# Patient Record
Sex: MALE | Race: WHITE | NOT HISPANIC OR LATINO | URBAN - METROPOLITAN AREA
[De-identification: names, ages, dates, MRNs, and addresses within clinical notes are randomized per-mention and may not be internally consistent; named-entity substitution may affect disease eponyms.]

---

## 2018-12-26 ENCOUNTER — EMERGENCY (EMERGENCY)
Facility: HOSPITAL | Age: 83
LOS: 1 days | Discharge: HOME | End: 2018-12-26
Attending: EMERGENCY MEDICINE
Payer: MEDICARE

## 2018-12-26 VITALS
OXYGEN SATURATION: 95 % | RESPIRATION RATE: 16 BRPM | DIASTOLIC BLOOD PRESSURE: 87 MMHG | SYSTOLIC BLOOD PRESSURE: 209 MMHG | WEIGHT: 149.91 LBS | HEART RATE: 57 BPM | TEMPERATURE: 97 F | HEIGHT: 67 IN

## 2018-12-26 LAB
ALBUMIN SERPL ELPH-MCNC: 4.2 G/DL — SIGNIFICANT CHANGE UP (ref 3.3–5.2)
ALP SERPL-CCNC: 59 U/L — SIGNIFICANT CHANGE UP (ref 40–120)
ALT FLD-CCNC: 12 U/L — SIGNIFICANT CHANGE UP
ANION GAP SERPL CALC-SCNC: 11 MMOL/L — SIGNIFICANT CHANGE UP (ref 5–17)
APPEARANCE UR: CLEAR — SIGNIFICANT CHANGE UP
APTT BLD: 30.8 SEC — SIGNIFICANT CHANGE UP (ref 27.5–36.3)
AST SERPL-CCNC: 24 U/L — SIGNIFICANT CHANGE UP
BACTERIA # UR AUTO: ABNORMAL
BASOPHILS # BLD AUTO: 0 K/UL — SIGNIFICANT CHANGE UP (ref 0–0.2)
BASOPHILS NFR BLD AUTO: 0.3 % — SIGNIFICANT CHANGE UP (ref 0–2)
BILIRUB SERPL-MCNC: 0.4 MG/DL — SIGNIFICANT CHANGE UP (ref 0.4–2)
BILIRUB UR-MCNC: NEGATIVE — SIGNIFICANT CHANGE UP
BUN SERPL-MCNC: 38 MG/DL — HIGH (ref 8–20)
CALCIUM SERPL-MCNC: 9.3 MG/DL — SIGNIFICANT CHANGE UP (ref 8.6–10.2)
CHLORIDE SERPL-SCNC: 104 MMOL/L — SIGNIFICANT CHANGE UP (ref 98–107)
CO2 SERPL-SCNC: 27 MMOL/L — SIGNIFICANT CHANGE UP (ref 22–29)
COLOR SPEC: YELLOW — SIGNIFICANT CHANGE UP
COMMENT - URINE: SIGNIFICANT CHANGE UP
CREAT SERPL-MCNC: 0.83 MG/DL — SIGNIFICANT CHANGE UP (ref 0.5–1.3)
DIFF PNL FLD: ABNORMAL
EOSINOPHIL # BLD AUTO: 0.1 K/UL — SIGNIFICANT CHANGE UP (ref 0–0.5)
EOSINOPHIL NFR BLD AUTO: 1.4 % — SIGNIFICANT CHANGE UP (ref 0–5)
EPI CELLS # UR: SIGNIFICANT CHANGE UP
GLUCOSE SERPL-MCNC: 132 MG/DL — HIGH (ref 70–115)
GLUCOSE UR QL: NEGATIVE MG/DL — SIGNIFICANT CHANGE UP
HCT VFR BLD CALC: 44.7 % — SIGNIFICANT CHANGE UP (ref 42–52)
HGB BLD-MCNC: 14.4 G/DL — SIGNIFICANT CHANGE UP (ref 14–18)
INR BLD: 1.03 RATIO — SIGNIFICANT CHANGE UP (ref 0.88–1.16)
KETONES UR-MCNC: NEGATIVE — SIGNIFICANT CHANGE UP
LACTATE BLDV-MCNC: 1.2 MMOL/L — SIGNIFICANT CHANGE UP (ref 0.5–2)
LEUKOCYTE ESTERASE UR-ACNC: NEGATIVE — SIGNIFICANT CHANGE UP
LYMPHOCYTES # BLD AUTO: 1.1 K/UL — SIGNIFICANT CHANGE UP (ref 1–4.8)
LYMPHOCYTES # BLD AUTO: 14.3 % — LOW (ref 20–55)
MCHC RBC-ENTMCNC: 31.3 PG — HIGH (ref 27–31)
MCHC RBC-ENTMCNC: 32.2 G/DL — SIGNIFICANT CHANGE UP (ref 32–36)
MCV RBC AUTO: 97.2 FL — HIGH (ref 80–94)
MONOCYTES # BLD AUTO: 0.8 K/UL — SIGNIFICANT CHANGE UP (ref 0–0.8)
MONOCYTES NFR BLD AUTO: 9.5 % — SIGNIFICANT CHANGE UP (ref 3–10)
NEUTROPHILS # BLD AUTO: 5.9 K/UL — SIGNIFICANT CHANGE UP (ref 1.8–8)
NEUTROPHILS NFR BLD AUTO: 74.4 % — HIGH (ref 37–73)
NITRITE UR-MCNC: NEGATIVE — SIGNIFICANT CHANGE UP
PH UR: 8 — SIGNIFICANT CHANGE UP (ref 5–8)
PLATELET # BLD AUTO: 215 K/UL — SIGNIFICANT CHANGE UP (ref 150–400)
POTASSIUM SERPL-MCNC: 4.8 MMOL/L — SIGNIFICANT CHANGE UP (ref 3.5–5.3)
POTASSIUM SERPL-SCNC: 4.8 MMOL/L — SIGNIFICANT CHANGE UP (ref 3.5–5.3)
PROT SERPL-MCNC: 7.7 G/DL — SIGNIFICANT CHANGE UP (ref 6.6–8.7)
PROT UR-MCNC: 30 MG/DL
PROTHROM AB SERPL-ACNC: 11.9 SEC — SIGNIFICANT CHANGE UP (ref 10–12.9)
RBC # BLD: 4.6 M/UL — SIGNIFICANT CHANGE UP (ref 4.6–6.2)
RBC # FLD: 14.1 % — SIGNIFICANT CHANGE UP (ref 11–15.6)
RBC CASTS # UR COMP ASSIST: SIGNIFICANT CHANGE UP /HPF (ref 0–4)
SODIUM SERPL-SCNC: 142 MMOL/L — SIGNIFICANT CHANGE UP (ref 135–145)
SP GR SPEC: 1.01 — SIGNIFICANT CHANGE UP (ref 1.01–1.02)
UROBILINOGEN FLD QL: NEGATIVE MG/DL — SIGNIFICANT CHANGE UP
WBC # BLD: 8 K/UL — SIGNIFICANT CHANGE UP (ref 4.8–10.8)
WBC # FLD AUTO: 8 K/UL — SIGNIFICANT CHANGE UP (ref 4.8–10.8)
WBC UR QL: SIGNIFICANT CHANGE UP

## 2018-12-26 PROCEDURE — 74177 CT ABD & PELVIS W/CONTRAST: CPT | Mod: 26

## 2018-12-26 PROCEDURE — 99284 EMERGENCY DEPT VISIT MOD MDM: CPT | Mod: 25

## 2018-12-26 PROCEDURE — 93010 ELECTROCARDIOGRAM REPORT: CPT

## 2018-12-26 RX ORDER — ACETAMINOPHEN 500 MG
1000 TABLET ORAL ONCE
Qty: 0 | Refills: 0 | Status: COMPLETED | OUTPATIENT
Start: 2018-12-26 | End: 2018-12-26

## 2018-12-26 RX ORDER — HYDRALAZINE HCL 50 MG
10 TABLET ORAL ONCE
Qty: 0 | Refills: 0 | Status: COMPLETED | OUTPATIENT
Start: 2018-12-26 | End: 2018-12-26

## 2018-12-26 RX ORDER — MORPHINE SULFATE 50 MG/1
4 CAPSULE, EXTENDED RELEASE ORAL ONCE
Qty: 0 | Refills: 0 | Status: DISCONTINUED | OUTPATIENT
Start: 2018-12-26 | End: 2018-12-26

## 2018-12-26 RX ORDER — SODIUM CHLORIDE 9 MG/ML
1000 INJECTION INTRAMUSCULAR; INTRAVENOUS; SUBCUTANEOUS ONCE
Qty: 0 | Refills: 0 | Status: COMPLETED | OUTPATIENT
Start: 2018-12-26 | End: 2018-12-26

## 2018-12-26 RX ADMIN — Medication 1000 MILLIGRAM(S): at 20:30

## 2018-12-26 RX ADMIN — Medication 1000 MILLIGRAM(S): at 20:46

## 2018-12-26 RX ADMIN — SODIUM CHLORIDE 1000 MILLILITER(S): 9 INJECTION INTRAMUSCULAR; INTRAVENOUS; SUBCUTANEOUS at 20:16

## 2018-12-26 RX ADMIN — SODIUM CHLORIDE 1000 MILLILITER(S): 9 INJECTION INTRAMUSCULAR; INTRAVENOUS; SUBCUTANEOUS at 21:16

## 2018-12-26 RX ADMIN — Medication 400 MILLIGRAM(S): at 20:16

## 2018-12-26 NOTE — ED STATDOCS - ATTENDING CONTRIBUTION TO CARE
I, Melba Case, performed the initial face to face bedside interview with this patient regarding history of present illness, review of symptoms and relevant past medical, social and family history.  I completed an independent physical examination.   The medical decision making and follow-up on ordered tests (ie labs, radiologic studies) and re-evaluation of the patient's status has been communicated to the ACP.  Disposition of the patient will be based on test outcome and response to ED interventions.  The history, relevant review of systems, past medical and surgical history, medical decision making, and physical examination was documented by the scribe in my presence and I attest to the accuracy of the documentation.

## 2018-12-26 NOTE — ED STATDOCS - OBJECTIVE STATEMENT
88 y/o M pt with hx of no significant medical hx presents to ED c/o suprapubic/right groin region that is focal and hard mass, denies radiating pain. Last normal BM was yesterday but has not gone today, states he usually goes daily. Pain exacerbation with coughing. He states he ate a muffin earlier and believes it solidified in his abdomen into hard mass. Pt exercises regularly but has not done so over the last month. Pt repeats himself often, lives alone at home. Denies N/V/D, fever, chills, SOB, CP, difficulty breathing. 86 y/o M pt with hx of no significant medical hx presents to ED c/o suprapubic/right groin region that is focal and hard mass, denies radiating pain. Last normal BM was yesterday but has not gone today, states he usually goes daily. Pain exacerbation with coughing. He states he ate a muffin earlier and believes it solidified in his abdomen into hard mass. Pt exercises regularly but has not done so over the last month. Pt repeats himself often, lives alone at home. Denies N/V/D, fever, chills, SOB, CP, difficulty breathing.    PSH- appendectomy/cholecystectomy

## 2018-12-26 NOTE — ED STATDOCS - NS ED ROS FT
ROS: no CP/SOB. no cough. no fever. no n/v/d/c. (+) abd pain. no rash. no bleeding. no urinary complaints. no weakness. no vision changes. no HA. no neck/back pain. no extremity swelling/deformity. No change in mental status. ROS: no CP/SOB. no cough. no fever. no n/v/d. (+) abd pain. no rash. no bleeding. no urinary complaints. no weakness. no vision changes. no HA. no neck/back pain. no extremity swelling/deformity. No change in mental status.

## 2018-12-26 NOTE — ED ADULT NURSE NOTE - OBJECTIVE STATEMENT
received pt AOx4 in supertrack, c/o mass like lump lower right pelvic region, pt states he thinks he ate a corn muffin and it turned to concrete in his stomach. pt with 2 episode of vomiting in ed, sates he never sees doctors bc he is healthy and there is nothing wrong with him. resp even unlabored, in no distress, MAEx4, neuro intact. will continue to monitor

## 2018-12-26 NOTE — ED STATDOCS - PROGRESS NOTE DETAILS
88 y/o M pt with hx of no significant medical hx presents to ED c/o suprapubic/right groin region that is focal and hard mass, denies radiating pain. Last normal BM was yesterday but has not gone today, states he usually goes daily. Pain exacerbation with coughing. He states he ate a muffin earlier and believes it solidified in his abdomen into hard mass. Pt exercises regularly but has not done so over the last month. Pt repeats himself often, lives alone at home. Denies N/V/D, fever, chills, SOB, CP, difficulty breathing. PT evaluated by intake physician. I spoke to and re-examined patient and agree with HPI/PE/ROS as noted above. Will follow up plan per intake physician.  Patient states pain is improved from presentation at this time, is only mildly still present. Patient refusing medication for his elevated BP at this time. States he only takes vitamins Consulted surgery regarding CT abd/pel findings. Surgery will see patient in ED Patient seen by Surgery in ED. Surgery resident was able to reduce hernia in ED. Surgery recommend having patient placed in observation NPO with plan to re-assess patient in AM for possible admission. Will discuss case with Observation PA and decide plan.

## 2018-12-26 NOTE — ED ADULT NURSE NOTE - CAS EDN DISCHARGE ASSESSMENT
Alert and oriented to person, place and time/Patient in stable condition. No s/s of acute distress. Denies pain. Wants to go home. PAtient educated about importance of checking blood pressure. Discharge instructions given and explained to patient and patient verbalizes quick and easy understanding. Taxi company called for transportation, patient ambulatory. In stable condition.

## 2018-12-26 NOTE — ED ADULT NURSE NOTE - NSIMPLEMENTINTERV_GEN_ALL_ED
Implemented All Universal Safety Interventions:  [place] to call system. Call bell, personal items and telephone within reach. Instruct patient to call for assistance. Room bathroom lighting operational. Non-slip footwear when patient is off stretcher. Physically safe environment: no spills, clutter or unnecessary equipment. Stretcher in lowest position, wheels locked, appropriate side rails in place.

## 2018-12-26 NOTE — ED STATDOCS - PHYSICAL EXAMINATION
Gen: NAD, AOx3  Head: NCAT  HEENT: PERRL, EOMI, oral mucosa moist, normal conjunctiva, neck supple  Lung: CTAB, no respiratory distress  CV: rrr, no murmur, Normal perfusion  Abd: soft, no CVA tenderness, mass for right groin region, tender and nonreducible. No overlying skin changes.   MSK: No edema, no visible deformities  Neuro: No focal neurologic deficits  Skin: No rash   Psych: normal affect Gen: NAD, AOx3  Head: NCAT  HEENT: PERRL, EOMI, oral mucosa moist, normal conjunctiva, neck supple  Lung: CTAB, no respiratory distress  CV: rrr, no murmur, Normal perfusion  Abd: soft, no CVA tenderness, +mass right groin region, tender and nonreducible. No overlying skin changes.   MSK: No edema, no visible deformities  Neuro: No focal neurologic deficits  Skin: No rash   Psych: normal affect

## 2018-12-26 NOTE — ED ADULT NURSE REASSESSMENT NOTE - NS ED NURSE REASSESS COMMENT FT1
pt back from ct at this time, awaiting results, updated regarding plan of care, will cont to monitor. resp even unlabored, In no distress.

## 2018-12-26 NOTE — ED STATDOCS - MEDICAL DECISION MAKING DETAILS
Pt with new onset RLQ pain with mass x 1 day, denies BM today. possible hernial obstruction, non toxic appearing. Pt is hypertensive, will treat pain first. No HA or CP has not seen doctor in years. Pt with new onset RLQ pain with mass x 1 day, denies BM today. possible hernial obstruction, non toxic appearing. Pt is hypertensive, will treat pain first. denies any PMH but has not seen Doctor in years. No HA or CP. screening EKG no acute ischemic changes. will check basic labs, urine and CT A/P

## 2018-12-27 VITALS — SYSTOLIC BLOOD PRESSURE: 161 MMHG | DIASTOLIC BLOOD PRESSURE: 81 MMHG

## 2018-12-27 LAB
ANION GAP SERPL CALC-SCNC: 11 MMOL/L — SIGNIFICANT CHANGE UP (ref 5–17)
BASOPHILS # BLD AUTO: 0 K/UL — SIGNIFICANT CHANGE UP (ref 0–0.2)
BASOPHILS NFR BLD AUTO: 0.3 % — SIGNIFICANT CHANGE UP (ref 0–2)
BUN SERPL-MCNC: 25 MG/DL — HIGH (ref 8–20)
CALCIUM SERPL-MCNC: 8.3 MG/DL — LOW (ref 8.6–10.2)
CHLORIDE SERPL-SCNC: 104 MMOL/L — SIGNIFICANT CHANGE UP (ref 98–107)
CO2 SERPL-SCNC: 26 MMOL/L — SIGNIFICANT CHANGE UP (ref 22–29)
CREAT SERPL-MCNC: 0.78 MG/DL — SIGNIFICANT CHANGE UP (ref 0.5–1.3)
EOSINOPHIL # BLD AUTO: 0.1 K/UL — SIGNIFICANT CHANGE UP (ref 0–0.5)
EOSINOPHIL NFR BLD AUTO: 0.8 % — SIGNIFICANT CHANGE UP (ref 0–5)
GLUCOSE SERPL-MCNC: 100 MG/DL — SIGNIFICANT CHANGE UP (ref 70–115)
HCT VFR BLD CALC: 37.4 % — LOW (ref 42–52)
HGB BLD-MCNC: 12.1 G/DL — LOW (ref 14–18)
LYMPHOCYTES # BLD AUTO: 1.3 K/UL — SIGNIFICANT CHANGE UP (ref 1–4.8)
LYMPHOCYTES # BLD AUTO: 18.1 % — LOW (ref 20–55)
MCHC RBC-ENTMCNC: 31.3 PG — HIGH (ref 27–31)
MCHC RBC-ENTMCNC: 32.4 G/DL — SIGNIFICANT CHANGE UP (ref 32–36)
MCV RBC AUTO: 96.6 FL — HIGH (ref 80–94)
MONOCYTES # BLD AUTO: 0.8 K/UL — SIGNIFICANT CHANGE UP (ref 0–0.8)
MONOCYTES NFR BLD AUTO: 10.7 % — HIGH (ref 3–10)
NEUTROPHILS # BLD AUTO: 4.9 K/UL — SIGNIFICANT CHANGE UP (ref 1.8–8)
NEUTROPHILS NFR BLD AUTO: 69.8 % — SIGNIFICANT CHANGE UP (ref 37–73)
PLATELET # BLD AUTO: 185 K/UL — SIGNIFICANT CHANGE UP (ref 150–400)
POTASSIUM SERPL-MCNC: 3.8 MMOL/L — SIGNIFICANT CHANGE UP (ref 3.5–5.3)
POTASSIUM SERPL-SCNC: 3.8 MMOL/L — SIGNIFICANT CHANGE UP (ref 3.5–5.3)
RBC # BLD: 3.87 M/UL — LOW (ref 4.6–6.2)
RBC # FLD: 14 % — SIGNIFICANT CHANGE UP (ref 11–15.6)
SODIUM SERPL-SCNC: 141 MMOL/L — SIGNIFICANT CHANGE UP (ref 135–145)
WBC # BLD: 7.1 K/UL — SIGNIFICANT CHANGE UP (ref 4.8–10.8)
WBC # FLD AUTO: 7.1 K/UL — SIGNIFICANT CHANGE UP (ref 4.8–10.8)

## 2018-12-27 PROCEDURE — 96361 HYDRATE IV INFUSION ADD-ON: CPT

## 2018-12-27 PROCEDURE — 80053 COMPREHEN METABOLIC PANEL: CPT

## 2018-12-27 PROCEDURE — 85610 PROTHROMBIN TIME: CPT

## 2018-12-27 PROCEDURE — 36415 COLL VENOUS BLD VENIPUNCTURE: CPT

## 2018-12-27 PROCEDURE — 93005 ELECTROCARDIOGRAM TRACING: CPT

## 2018-12-27 PROCEDURE — G0378: CPT

## 2018-12-27 PROCEDURE — 85730 THROMBOPLASTIN TIME PARTIAL: CPT

## 2018-12-27 PROCEDURE — 74177 CT ABD & PELVIS W/CONTRAST: CPT

## 2018-12-27 PROCEDURE — 99284 EMERGENCY DEPT VISIT MOD MDM: CPT | Mod: 25

## 2018-12-27 PROCEDURE — 99218: CPT

## 2018-12-27 PROCEDURE — 80048 BASIC METABOLIC PNL TOTAL CA: CPT

## 2018-12-27 PROCEDURE — 83605 ASSAY OF LACTIC ACID: CPT

## 2018-12-27 PROCEDURE — 81001 URINALYSIS AUTO W/SCOPE: CPT

## 2018-12-27 PROCEDURE — 85027 COMPLETE CBC AUTOMATED: CPT

## 2018-12-27 PROCEDURE — 96374 THER/PROPH/DIAG INJ IV PUSH: CPT | Mod: XU

## 2018-12-27 RX ORDER — SODIUM CHLORIDE 9 MG/ML
1000 INJECTION, SOLUTION INTRAVENOUS
Qty: 0 | Refills: 0 | Status: DISCONTINUED | OUTPATIENT
Start: 2018-12-27 | End: 2018-12-31

## 2018-12-27 RX ORDER — SODIUM CHLORIDE 9 MG/ML
1000 INJECTION INTRAMUSCULAR; INTRAVENOUS; SUBCUTANEOUS ONCE
Qty: 0 | Refills: 0 | Status: DISCONTINUED | OUTPATIENT
Start: 2018-12-27 | End: 2018-12-27

## 2018-12-27 RX ADMIN — SODIUM CHLORIDE 100 MILLILITER(S): 9 INJECTION, SOLUTION INTRAVENOUS at 02:50

## 2018-12-27 NOTE — ED ADULT NURSE REASSESSMENT NOTE - NS ED NURSE REASSESS COMMENT FT1
Assumed care of the patient at 0730. Patient A&Ox3. No s/s of distress. Abdomen soft, denies any abdominal pain at this time, denies n/v/d, states passing gas. Per nightshift RN patient have been hypertensive, PA aware however patient refuses any medications for BP, patient educated about importance of maintaing BP WDL, patient states "he will just continue taking his vitamins". BP this am better 163/81, HR 80. Patient denies any HA or visual disturbances. Resting comfortably. Surgery to see patient. Will continue to monitor.

## 2018-12-27 NOTE — CONSULT NOTE ADULT - ATTENDING COMMENTS
Incarcerated right inguinal hernia reduced in ER.  Place under observation for serial abdominal examinations. Patient prefers to schedule hernia repair on outpatient basis.  If abdominal exam remains benign, PO challenge in AM and then patient to follow up in clinica as outpatient to plan for hernia repair. Incarcerated right inguinal hernia reduced in ER.  Place under observation for serial abdominal examinations. Patient advised to be admitted for hernia repair due to risks of reicnarceration with strangulation. However, Patient prefers to schedule hernia repair on outpatient basis.  If abdominal exam remains benign, PO challenge in AM and then patient to follow up in clinica as outpatient to plan for hernia repair.

## 2018-12-27 NOTE — ED CDU PROVIDER DISPOSITION NOTE - ATTENDING CONTRIBUTION TO CARE
Admitted to observation following reduction of incarcerated inguinal hernia.  Seen by surgery this morning and advised admission for hernia repair: patient refused.  Educated on signs and symptoms of obstruction.  Also advised about blood pressure elevation.  Patient has not seen a doctor in more than 3 years and does not want to take anything other than vitamins for health maintenance.  Advised routine BP checks and follow-up with physician if persistently above 140/85.  BP on discharge 161/85.

## 2018-12-27 NOTE — ED ADULT NURSE REASSESSMENT NOTE - NS ED NURSE REASSESS COMMENT FT1
pt A&Ox4, pt still refusing BP meds and gets irritated when asked about or educated on importance of meds and risks of HTN... ARPITA almaraz

## 2018-12-27 NOTE — ED ADULT NURSE REASSESSMENT NOTE - NS ED NURSE REASSESS COMMENT FT1
BP elevated, 193/85. Pt continuing to refuse medication for tmt of BP. Pt denies vision changes or headache. PA aware.

## 2018-12-27 NOTE — ED CDU PROVIDER INITIAL DAY NOTE - SHIFT CHANGE DETAILS
Patient is sitting comfortably, 2nd CBC resulted, informed surgery of results, surgery states coming to see the patient this morning and will develop a plan, monitor patient and reassess

## 2018-12-27 NOTE — ED ADULT NURSE REASSESSMENT NOTE - NS ED NURSE REASSESS COMMENT FT1
Report received from HENRY Vanessa. Assumed care of pt @ 0230, charting as noted. Pt AOx4 in no acute distress. Pt with c/o abdominal hernia. VS assessed, BP remains elevated and pt continuing to refuse any blood pressure medications from staff. Pt becoming agitated with repeated blood pressure education from staff. Pt denies any blurry vision or headaches. ARPITA mendez aware, no new orders due to pt refusal of medications. Pt educated on observation stay and plan of care. Bed locked in lowest position, call bell in reach. [place] and urinal provided for pt comfort.

## 2018-12-27 NOTE — ED CDU PROVIDER DISPOSITION NOTE - CLINICAL COURSE
Patient is a 86 y/o male presenting with surapubic/right groin region. CT abdomen and pelvis significant for right inguinal hernia with at least a partial small bowel obstruction. Surgery consulted, was able to reduce the hernia, patient passing flatus and tolerating PO, serial abdominal exams preformed on patient, repeat CBC, surgery re-evaluated patient, patients abdomen soft, nontender, hernia reduced, patient to follow up out patiently with surgery clinic, copy of results printed for pt, pt explained results and d/c instructions

## 2018-12-27 NOTE — ED CDU PROVIDER INITIAL DAY NOTE - OBJECTIVE STATEMENT
88 y/o male no significant medical history present to ED c/o suprapubic and right groin pain. Denies radiation in pain. States pain is worsened by coughing. Believes mass to be the muffin he ate earlier today that solidified in his abdomen. Pt is a poor historian

## 2018-12-27 NOTE — ED ADULT NURSE REASSESSMENT NOTE - NS ED NURSE REASSESS COMMENT FT1
Report given to HENRY lara. Pt AOx4 in stable condition. Repeat BP takenas requested by ARPITA maher. Endorsed to day RN. PA at bedside with day RN.

## 2018-12-27 NOTE — CONSULT NOTE ADULT - ASSESSMENT
86 yo M w/ right inguinal hernia, reducible    Admit to observation in ED overnight  NPO/IVF  Pain control  Serial abdominal exams  Repeat labs in AM  If patient's abdominal exam remains benign and doing well in AM, will advance diet  Plan for the patient to follow up in the [facility] clinic regarding a discussion regarding the benefits/risks of hernia surgery

## 2018-12-27 NOTE — ED ADULT NURSE REASSESSMENT NOTE - NS ED NURSE REASSESS COMMENT FT1
Pt's AM labs resulted. Pt's Hgb decreased from 14.4 to 12.1 overnight. ARPITA Abrams and ARPITA de santiago made aware. Pt remains AOx4, no complains of pain, pt in NAD. Awaiting additional orders from MD.

## 2018-12-27 NOTE — CONSULT NOTE ADULT - SUBJECTIVE AND OBJECTIVE BOX
HPI: 86 yo M w/ Right sided inguinal hernia. Patient states he was putting on a tight pair of jeans, after wearing them for a couple hrs, around 4:30pm on  he noticed a bulge in his right groin. States never has experienced this bulge before. States had mild pain from the bulge. Does state that he has a hydrocele on the right in which he has had his whole life. Complains of mild nausea initially, however, has now resolved. Denies vomiting. Last BM was yesterday - normal in color and consistency. Passing flatus today. Voiding at baseline. Denies fevers/chills.     Pmhx: Denies  Pshx: Appendectomy, Cholecystectomy  Meds: Multivitamin  Allergies: NKDA  Shx: Former smoker, denies x 2      MEDICATIONS  (STANDING):    MEDICATIONS  (PRN):      Vital Signs Last 24 Hrs  T(C): 36.2 (27 Dec 2018 01:29), Max: 36.3 (26 Dec 2018 18:52)  T(F): 97.2 (27 Dec 2018 01:29), Max: 97.3 (26 Dec 2018 18:52)  HR: 68 (27 Dec 2018 01:29) (57 - 68)  BP: 191/85 (27 Dec 2018 01:29) (191/85 - 210/79)  BP(mean): --  RR: 16 (27 Dec 2018 01:29) (16 - 16)  SpO2: 96% (27 Dec 2018 01:29) (95% - 96%)    PE  Gen: AOX3, NAD  Pulm: Non labored breathing  CV: RRR  Abd: Soft, ND, NT, small palpable right groin mass reduced in ED, non-tender, no overlying skin changes. Hydrocele in R scrotum  Ext: Moving all 4 extremities  Vasc: +2 radial pulses bilaterally  Neuro: Good affect, talkative      I&O's Detail      LABS:                        14.4   8.0   )-----------( 215      ( 26 Dec 2018 20:10 )             44.7         142  |  104  |  38.0<H>  ----------------------------<  132<H>  4.8   |  27.0  |  0.83    Ca    9.3      26 Dec 2018 20:10    TPro  7.7  /  Alb  4.2  /  TBili  0.4  /  DBili  x   /  AST  24  /  ALT  12  /  AlkPhos  59  12-26    PT/INR - ( 26 Dec 2018 20:10 )   PT: 11.9 sec;   INR: 1.03 ratio         PTT - ( 26 Dec 2018 20:10 )  PTT:30.8 sec  Urinalysis Basic - ( 26 Dec 2018 20:19 )    Color: Yellow / Appearance: Clear / S.015 / pH: x  Gluc: x / Ketone: Negative  / Bili: Negative / Urobili: Negative mg/dL   Blood: x / Protein: 30 mg/dL / Nitrite: Negative   Leuk Esterase: Negative / RBC: 0-2 /HPF / WBC 3-5   Sq Epi: x / Non Sq Epi: Occasional / Bacteria: Moderate        RADIOLOGY & ADDITIONAL STUDIES:

## 2019-05-21 ENCOUNTER — EMERGENCY (EMERGENCY)
Facility: HOSPITAL | Age: 84
LOS: 1 days | Discharge: HOME | End: 2019-05-21
Attending: EMERGENCY MEDICINE
Payer: MEDICARE

## 2019-05-21 VITALS
TEMPERATURE: 98 F | SYSTOLIC BLOOD PRESSURE: 147 MMHG | DIASTOLIC BLOOD PRESSURE: 73 MMHG | OXYGEN SATURATION: 94 % | RESPIRATION RATE: 18 BRPM | HEART RATE: 60 BPM

## 2019-05-21 VITALS
SYSTOLIC BLOOD PRESSURE: 184 MMHG | DIASTOLIC BLOOD PRESSURE: 82 MMHG | TEMPERATURE: 99 F | RESPIRATION RATE: 18 BRPM | OXYGEN SATURATION: 98 % | HEART RATE: 66 BPM

## 2019-05-21 LAB
ALBUMIN SERPL ELPH-MCNC: 4.1 G/DL — SIGNIFICANT CHANGE UP (ref 3.3–5.2)
ALP SERPL-CCNC: 70 U/L — SIGNIFICANT CHANGE UP (ref 40–120)
ALT FLD-CCNC: 10 U/L — SIGNIFICANT CHANGE UP
ANION GAP SERPL CALC-SCNC: 14 MMOL/L — SIGNIFICANT CHANGE UP (ref 5–17)
APPEARANCE UR: CLEAR — SIGNIFICANT CHANGE UP
APTT BLD: 28.2 SEC — SIGNIFICANT CHANGE UP (ref 27.5–36.3)
AST SERPL-CCNC: 17 U/L — SIGNIFICANT CHANGE UP
BASOPHILS # BLD AUTO: 0 K/UL — SIGNIFICANT CHANGE UP (ref 0–0.2)
BASOPHILS NFR BLD AUTO: 0.1 % — SIGNIFICANT CHANGE UP (ref 0–2)
BILIRUB SERPL-MCNC: 0.5 MG/DL — SIGNIFICANT CHANGE UP (ref 0.4–2)
BILIRUB UR-MCNC: NEGATIVE — SIGNIFICANT CHANGE UP
BLD GP AB SCN SERPL QL: SIGNIFICANT CHANGE UP
BUN SERPL-MCNC: 40 MG/DL — HIGH (ref 8–20)
CALCIUM SERPL-MCNC: 9.4 MG/DL — SIGNIFICANT CHANGE UP (ref 8.6–10.2)
CHLORIDE SERPL-SCNC: 98 MMOL/L — SIGNIFICANT CHANGE UP (ref 98–107)
CO2 SERPL-SCNC: 26 MMOL/L — SIGNIFICANT CHANGE UP (ref 22–29)
COLOR SPEC: YELLOW — SIGNIFICANT CHANGE UP
CREAT SERPL-MCNC: 0.89 MG/DL — SIGNIFICANT CHANGE UP (ref 0.5–1.3)
DIFF PNL FLD: ABNORMAL
EOSINOPHIL # BLD AUTO: 0.1 K/UL — SIGNIFICANT CHANGE UP (ref 0–0.5)
EOSINOPHIL NFR BLD AUTO: 1.2 % — SIGNIFICANT CHANGE UP (ref 0–5)
EPI CELLS # UR: SIGNIFICANT CHANGE UP
GLUCOSE SERPL-MCNC: 113 MG/DL — SIGNIFICANT CHANGE UP (ref 70–115)
GLUCOSE UR QL: NEGATIVE MG/DL — SIGNIFICANT CHANGE UP
HCT VFR BLD CALC: 45.4 % — SIGNIFICANT CHANGE UP (ref 42–52)
HGB BLD-MCNC: 15.1 G/DL — SIGNIFICANT CHANGE UP (ref 14–18)
INR BLD: 1.02 RATIO — SIGNIFICANT CHANGE UP (ref 0.88–1.16)
KETONES UR-MCNC: NEGATIVE — SIGNIFICANT CHANGE UP
LACTATE BLDV-MCNC: 1.1 MMOL/L — SIGNIFICANT CHANGE UP (ref 0.5–2)
LEUKOCYTE ESTERASE UR-ACNC: NEGATIVE — SIGNIFICANT CHANGE UP
LIDOCAIN IGE QN: 58 U/L — HIGH (ref 22–51)
LYMPHOCYTES # BLD AUTO: 1.3 K/UL — SIGNIFICANT CHANGE UP (ref 1–4.8)
LYMPHOCYTES # BLD AUTO: 14.4 % — LOW (ref 20–55)
MCHC RBC-ENTMCNC: 31.3 PG — HIGH (ref 27–31)
MCHC RBC-ENTMCNC: 33.3 G/DL — SIGNIFICANT CHANGE UP (ref 32–36)
MCV RBC AUTO: 94.2 FL — HIGH (ref 80–94)
MONOCYTES # BLD AUTO: 0.8 K/UL — SIGNIFICANT CHANGE UP (ref 0–0.8)
MONOCYTES NFR BLD AUTO: 9.4 % — SIGNIFICANT CHANGE UP (ref 3–10)
NEUTROPHILS # BLD AUTO: 6.7 K/UL — SIGNIFICANT CHANGE UP (ref 1.8–8)
NEUTROPHILS NFR BLD AUTO: 74.8 % — HIGH (ref 37–73)
NITRITE UR-MCNC: NEGATIVE — SIGNIFICANT CHANGE UP
PH UR: 7 — SIGNIFICANT CHANGE UP (ref 5–8)
PLATELET # BLD AUTO: 227 K/UL — SIGNIFICANT CHANGE UP (ref 150–400)
POTASSIUM SERPL-MCNC: 4.1 MMOL/L — SIGNIFICANT CHANGE UP (ref 3.5–5.3)
POTASSIUM SERPL-SCNC: 4.1 MMOL/L — SIGNIFICANT CHANGE UP (ref 3.5–5.3)
PROT SERPL-MCNC: 7.8 G/DL — SIGNIFICANT CHANGE UP (ref 6.6–8.7)
PROT UR-MCNC: 15 MG/DL
PROTHROM AB SERPL-ACNC: 11.7 SEC — SIGNIFICANT CHANGE UP (ref 10–12.9)
RBC # BLD: 4.82 M/UL — SIGNIFICANT CHANGE UP (ref 4.6–6.2)
RBC # FLD: 14.4 % — SIGNIFICANT CHANGE UP (ref 11–15.6)
RBC CASTS # UR COMP ASSIST: ABNORMAL /HPF (ref 0–4)
SODIUM SERPL-SCNC: 138 MMOL/L — SIGNIFICANT CHANGE UP (ref 135–145)
SP GR SPEC: 1 — LOW (ref 1.01–1.02)
TYPE + AB SCN PNL BLD: SIGNIFICANT CHANGE UP
UROBILINOGEN FLD QL: NEGATIVE MG/DL — SIGNIFICANT CHANGE UP
WBC # BLD: 9 K/UL — SIGNIFICANT CHANGE UP (ref 4.8–10.8)
WBC # FLD AUTO: 9 K/UL — SIGNIFICANT CHANGE UP (ref 4.8–10.8)
WBC UR QL: SIGNIFICANT CHANGE UP

## 2019-05-21 PROCEDURE — 87086 URINE CULTURE/COLONY COUNT: CPT

## 2019-05-21 PROCEDURE — 74018 RADEX ABDOMEN 1 VIEW: CPT | Mod: 26

## 2019-05-21 PROCEDURE — 85027 COMPLETE CBC AUTOMATED: CPT

## 2019-05-21 PROCEDURE — 74018 RADEX ABDOMEN 1 VIEW: CPT

## 2019-05-21 PROCEDURE — 86901 BLOOD TYPING SEROLOGIC RH(D): CPT

## 2019-05-21 PROCEDURE — 85610 PROTHROMBIN TIME: CPT

## 2019-05-21 PROCEDURE — 93010 ELECTROCARDIOGRAM REPORT: CPT

## 2019-05-21 PROCEDURE — 36415 COLL VENOUS BLD VENIPUNCTURE: CPT

## 2019-05-21 PROCEDURE — 96374 THER/PROPH/DIAG INJ IV PUSH: CPT | Mod: XU

## 2019-05-21 PROCEDURE — 81001 URINALYSIS AUTO W/SCOPE: CPT

## 2019-05-21 PROCEDURE — 83605 ASSAY OF LACTIC ACID: CPT

## 2019-05-21 PROCEDURE — 80053 COMPREHEN METABOLIC PANEL: CPT

## 2019-05-21 PROCEDURE — 86900 BLOOD TYPING SEROLOGIC ABO: CPT

## 2019-05-21 PROCEDURE — 99284 EMERGENCY DEPT VISIT MOD MDM: CPT | Mod: 25

## 2019-05-21 PROCEDURE — 93005 ELECTROCARDIOGRAM TRACING: CPT

## 2019-05-21 PROCEDURE — 83690 ASSAY OF LIPASE: CPT

## 2019-05-21 PROCEDURE — 74177 CT ABD & PELVIS W/CONTRAST: CPT | Mod: 26

## 2019-05-21 PROCEDURE — 74177 CT ABD & PELVIS W/CONTRAST: CPT

## 2019-05-21 PROCEDURE — 86850 RBC ANTIBODY SCREEN: CPT

## 2019-05-21 PROCEDURE — 85730 THROMBOPLASTIN TIME PARTIAL: CPT

## 2019-05-21 PROCEDURE — 99284 EMERGENCY DEPT VISIT MOD MDM: CPT

## 2019-05-21 RX ORDER — SODIUM CHLORIDE 9 MG/ML
1000 INJECTION INTRAMUSCULAR; INTRAVENOUS; SUBCUTANEOUS ONCE
Refills: 0 | Status: COMPLETED | OUTPATIENT
Start: 2019-05-21 | End: 2019-05-21

## 2019-05-21 RX ORDER — MORPHINE SULFATE 50 MG/1
4 CAPSULE, EXTENDED RELEASE ORAL ONCE
Refills: 0 | Status: DISCONTINUED | OUTPATIENT
Start: 2019-05-21 | End: 2019-05-21

## 2019-05-21 RX ADMIN — MORPHINE SULFATE 4 MILLIGRAM(S): 50 CAPSULE, EXTENDED RELEASE ORAL at 16:24

## 2019-05-21 RX ADMIN — SODIUM CHLORIDE 1000 MILLILITER(S): 9 INJECTION INTRAMUSCULAR; INTRAVENOUS; SUBCUTANEOUS at 16:24

## 2019-05-21 NOTE — ED ADULT NURSE REASSESSMENT NOTE - NS ED NURSE REASSESS COMMENT FT1
Pt d/c by Dr. Villagran and by RADHA, and disagreed with d/c orders stating "this hospital doesn't have any brains." Pt proceeded to remove IV by himself while alone in the room. Pt refused to sign d/c papers, stated "I'm not signing anything, you can mail them" and proceeded to walk out of lobby entrance.

## 2019-05-21 NOTE — ED ADULT NURSE NOTE - NSIMPLEMENTINTERV_GEN_ALL_ED
Implemented All Universal Safety Interventions:  [place] to call system. Call bell, personal items and telephone within reach. Instruct patient to call for assistance. Room bathroom lighting operational. Non-slip footwear when patient is off stretcher. Physically safe environment: no spills, clutter or unnecessary equipment. Stretcher in lowest position, wheels locked, appropriate side rails in place.

## 2019-05-21 NOTE — ED ADULT NURSE NOTE - OBJECTIVE STATEMENT
Pt arrives to ED c/o abdominal pain and states he has not been able to have a BM for the last couple days. Pt denies chest pain or SOB, states he does not see a doctor and does not take meds on a daily basis. Pt A &Ox 4 waiting for test results.

## 2019-05-21 NOTE — ED ADULT TRIAGE NOTE - CHIEF COMPLAINT QUOTE
ABD pain with some constipation. No blood in stool. No diarrhea. No vomit, some nausea. Point to lower ABD

## 2019-05-21 NOTE — ED PROVIDER NOTE - PHYSICAL EXAMINATION
VITAL SIGNS: I have reviewed nursing notes and confirm.  CONSTITUTIONAL: Well-developed; well-nourished; in no acute distress.  SKIN: Skin exam is warm and dry, no acute rash.  HEAD: Normocephalic; atraumatic.  EYES: PERRL, EOM intact; conjunctiva and sclera clear.  ENT: No nasal discharge; airway clear. Throat clear.  NECK: Supple; non tender.    CARD: S1, S2 normal; no murmurs, gallops, or rubs. Regular rate and rhythm.  RESP: No wheezes,  no rales or rhonchi.   ABD:  soft; non-distended;  (+) RLQ pain . no mass palpated ;   EXT: Normal ROM. No clubbing, cyanosis or edema.  NEURO: Alert, oriented. Grossly unremarkable. No focal deficits. no facial droop, moves all extremities,    PSYCH: Cooperative, appropriate.

## 2019-05-21 NOTE — ED ADULT NURSE NOTE - FINAL NURSING ELECTRONIC SIGNATURE
Alert and oriented to person, place, time/situation. normal mood and affect. no apparent risk to self or others. 21-May-2019 23:07

## 2019-05-21 NOTE — ED PROVIDER NOTE - NS ED ROS FT
Review of Systems  •	CONSTITUTIONAL - no  fever, no diaphoresis, no weight change  •	SKIN - no rash  •	HEMATOLOGIC - no bleeding, no bruising  •	EYES - no eye pain, no blurred vision  •	ENT - no change in hearing, no pain  •	RESPIRATORY - no shortness of breath, no cough  •	CARDIAC - no chest pain, no palpitations  •	GI -  (+)  abd pain, no nausea, no vomiting, no diarrhea,  (+) constipation, no bleeding  •	GENITO-URINARY - no discharge, no dysuria; no hematuria,   •	ENDO - no polydypsia, no polyurea, no heat/no cold intolerance  •	MUSCULOSKELETAL - no joint pain, no swelling, no redness  •	NEUROLOGIC - no weakness, no headache, no anesthesia, no paresthesias  •	PSYCH - no anxiety, non suicidal, non homicidal, no hallucination, no depression

## 2019-05-21 NOTE — ED ADULT NURSE REASSESSMENT NOTE - NS ED NURSE REASSESS COMMENT FT1
report received from offgoing nurse KILO. Pt received AOx4, resting in bed comfortably at this time, no apparent distress noted at this time. pt safety maintained. Pt denies any complaints at this time. pt educated on plan of care, plan of care taught back to RN. plan of care education deemed proficient through successful teach back. will continue to reeducate pt regarding plan of care.

## 2019-05-21 NOTE — ED PROVIDER NOTE - CLINICAL SUMMARY MEDICAL DECISION MAKING FREE TEXT BOX
86 yo M p/w RLQ pain, CT abdomen showed diverticulosis without diverticulitis, no hernia, no SBO. Blood work wnl. lacate wnl. Symptoms likely of constipation. will go home with stool softer and laxitive. 86 yo M p/w RLQ pain, CT abdomen showed diverticulosis without diverticulitis, no hernia, no SBO. Blood work wnl. lacate wnl. Symptoms likely of constipation.

## 2019-05-21 NOTE — CHART NOTE - NSCHARTNOTEFT_GEN_A_CORE
RADHA Note - request by Dr Villagran - pt reporting he lives in a home that doesn't have heat - so cold days he slept in his car. His dtr Lisa Curtisckett [phone] lives in [place] and is on her way here to [place]. pt was DC from ER earlier today but was refusing to leave. RADHA discussed options with pt and he was very rude, using racial slurs, calling staff "stupid, lacking common sense, and dissociated from pt care" Dr Villagran walked into room while RADHA was discussing DC Plan and pt called her racial slurs, disparaged her culture and used profanity . pt then got out of stretcher and began dressing. pt removed his IV - SW got RN and pt was bandaged. pt then stated his car is with [employer] and he would go home. pt is aware his dtr is on her way to [place]. RADHA escorted pt to Lobby - he refused to sign or accept his DC Papers.

## 2019-05-21 NOTE — ED PROVIDER NOTE - OBJECTIVE STATEMENT
86 yo M hx of appendectomy and choleystctomy p/w RLQ pain with a lump. 88 yo M hx of appendectomy and choleystctomy, and hiatal hernia,  p/w RLQ pain with a lump x 2 day. He had small BM this morning. He is able to tolerate PO. No fever, no cough, no congestion.

## 2019-05-21 NOTE — ED PROVIDER NOTE - PROGRESS NOTE DETAILS
CT showed no SBO. I spoke to patient. Patient refused to go home. He states that there is no heating in his room and he has to sleep in his car. patient report that this is too dark for him to go home. Patient refused to tell me more about his living situation and why he feels "unsafe" to go home. SW consulted. I spoke to the daughter. She report that he has no heat at home. However, there is no need for heating at this time. patient still refuse to leave report that "because of your culture, you don't know anything. I gave you a reason why I can't go home and you won't accept it". patient refuse to tell me why he won't go home other than that he doesn't have heating. SW is still yet to see the patient. patient ripped his IV out and now wants to leave. SW work at bedside. patient was verbally abusive towards staff, RN, SW, and me and making racial remarks. patient ripped his IV out and now wants to leave. patient walked out of the hospital prior to getting discharge paper. I spoke to his daughter informing his condition and need to take stool softener and laxative to go home.

## 2019-05-21 NOTE — ED STATDOCS - PROGRESS NOTE DETAILS
86 y/o M, with hx of appendectomy, presents to the ED c/o lower abdominal pain, onset a few hours ago.  Pt states that pain onset suddenly and pt describes a "hard lump" in his lower abdomen.  Denies similar sx in the past.  Denies self medicating for pain.  On exam: large right inguinal hernia, appears incarcerated, with significant tenderness on abdominal exam, will send pt to critical care and defer rest of exam.  [facility] doctor, Dr. Villagran, made aware of pt.

## 2019-05-23 LAB
CULTURE RESULTS: NO GROWTH — SIGNIFICANT CHANGE UP
SPECIMEN SOURCE: SIGNIFICANT CHANGE UP

## 2019-07-13 ENCOUNTER — EMERGENCY (EMERGENCY)
Facility: HOSPITAL | Age: 84
LOS: 1 days | Discharge: HOME | End: 2019-07-13
Attending: EMERGENCY MEDICINE
Payer: MEDICARE

## 2019-07-13 VITALS
DIASTOLIC BLOOD PRESSURE: 111 MMHG | RESPIRATION RATE: 16 BRPM | HEART RATE: 68 BPM | SYSTOLIC BLOOD PRESSURE: 224 MMHG | TEMPERATURE: 98 F | WEIGHT: 149.91 LBS | OXYGEN SATURATION: 97 % | HEIGHT: 67 IN

## 2019-07-13 LAB
APPEARANCE UR: CLEAR — SIGNIFICANT CHANGE UP
BILIRUB UR-MCNC: NEGATIVE — SIGNIFICANT CHANGE UP
COLOR SPEC: YELLOW — SIGNIFICANT CHANGE UP
DIFF PNL FLD: ABNORMAL
EPI CELLS # UR: SIGNIFICANT CHANGE UP
GLUCOSE UR QL: NEGATIVE MG/DL — SIGNIFICANT CHANGE UP
KETONES UR-MCNC: NEGATIVE — SIGNIFICANT CHANGE UP
LEUKOCYTE ESTERASE UR-ACNC: ABNORMAL
NITRITE UR-MCNC: NEGATIVE — SIGNIFICANT CHANGE UP
PH UR: 7 — SIGNIFICANT CHANGE UP (ref 5–8)
PROT UR-MCNC: 30 MG/DL
RBC CASTS # UR COMP ASSIST: ABNORMAL /HPF (ref 0–4)
SP GR SPEC: 1.01 — SIGNIFICANT CHANGE UP (ref 1.01–1.02)
UROBILINOGEN FLD QL: NEGATIVE MG/DL — SIGNIFICANT CHANGE UP
WBC UR QL: SIGNIFICANT CHANGE UP

## 2019-07-13 PROCEDURE — 70450 CT HEAD/BRAIN W/O DYE: CPT

## 2019-07-13 PROCEDURE — 74176 CT ABD & PELVIS W/O CONTRAST: CPT | Mod: 26

## 2019-07-13 PROCEDURE — 70450 CT HEAD/BRAIN W/O DYE: CPT | Mod: 26

## 2019-07-13 PROCEDURE — 74176 CT ABD & PELVIS W/O CONTRAST: CPT

## 2019-07-13 PROCEDURE — 81001 URINALYSIS AUTO W/SCOPE: CPT

## 2019-07-13 PROCEDURE — 99284 EMERGENCY DEPT VISIT MOD MDM: CPT

## 2019-07-13 PROCEDURE — 87086 URINE CULTURE/COLONY COUNT: CPT

## 2019-07-13 PROCEDURE — 99284 EMERGENCY DEPT VISIT MOD MDM: CPT | Mod: 25

## 2019-07-13 NOTE — ED PROVIDER NOTE - CLINICAL SUMMARY MEDICAL DECISION MAKING FREE TEXT BOX
Pt comes in due to urinary incontinence, pt with evidence of hypertension refusing labowork or medications. Enlarged prostate found on CT, pt given follow up with urology. Pt comes in due to urinary incontinence, pt with evidence of hypertension refusing labowork or medications. Enlarged prostate found on CT, pt given follow up with urology.  MALE WITH INCONTINENCE. FOUND TO BE HTNSVE. REFUSING TREATMENT OR EVALUATION FOR IT DESPITE LENGTHY EXPLAINATIONS OF WHY HE NEEDS THIS TO EVALUATE HIS INCONTINENCE. AGREES TO UA CT. PE NON FOCAL. EVAL SIG  [facility] WILL REF TO [facility]RO AS OUTPT

## 2019-07-13 NOTE — ED ADULT NURSE NOTE - OBJECTIVE STATEMENT
Pt c/o being incontinent of urine that started last night. He denies any burning with urination. At this time, pt ambulating to rest room. A&OX4. Denies sob, chest pain, NVD. Denies HA, blur vision. Pt c/o being incontinent of urine that started last night. He denies any burning with urination. At this time, pt ambulating to rest room. A&OX4. Denies sob, chest pain, NVD. Denies HA, blur vision. Pt refuses repeat VS, BP meds, blood work. Pt is aware of risk of elevated BP, including stroke, LOC and death. Pt verbalizes understanding. ARPITA Davis and MD Wooten has been made aware.

## 2019-07-13 NOTE — ED PROVIDER NOTE - ATTENDING CONTRIBUTION TO CARE
I, Joi Wooten, performed the initial face to face bedside interview with this patient regarding history of present illness, review of symptoms and relevant past medical, social and family history.  I completed an independent physical examination.  I was the initial provider who evaluated this patient. I have signed out the follow up of any pending tests (i.e. labs, radiological studies) to the ACP.  I have communicated the patient’s plan of care and disposition with the ACP.  The history, relevant review of systems, past medical and surgical history, medical decision making, and physical examination was documented by the scribe in my presence and I attest to the accuracy of the documentation. I, Joi Wooten, performed the initial face to face bedside interview with this patient regarding history of present illness, review of symptoms and relevant past medical, social and family history.  I completed an independent physical examination.  I was the initial provider who evaluated this patient. I have signed out the follow up of any pending tests (i.e. labs, radiological studies) to the ACP.  I have communicated the patient’s plan of care and disposition with the ACP.  SEE MDM

## 2019-07-13 NOTE — ED PROVIDER NOTE - PROGRESS NOTE DETAILS
Pt is A&Ox3 and refusing to have labwork, ultrasound or to take any medication for hypertension. Had a detailed discussion with pt regarding high blood pressure, pt reports he is aware of high blood pressure but refuses to take any medication for this. Explained to pt the risks of blood pressure being high, including stroke and death, pt acknowledges the risk and refusing any workup for this and refusing medications. Pt willing to sign refusal of treatment. Pt willing to have CT head at this time and to run urinalysis but refusing all other medical treatment. CT head wnl. CT renal showing thickening of the bladder and enlarged prostate, pt informed of these results and instructed to follow up with urology. Pt refusing to have blood pressure rechecked and refusing any blood pressure medications. Reiterated to pt importance of controlling blood pressure and gave pt [facility] follow up as well. Pt acknowledged understanding.

## 2019-07-13 NOTE — ED PROVIDER NOTE - OBJECTIVE STATEMENT
88yo male with no significant PMHx comes in due to urinary incontinence. Pt reports he was driving this afternoon when he experienced incontinence of urine. Pt reports this has never happened before. He has been told before that he has an enlarged prostate but has not had this issue before. Pt denies any back pain and has been ambulating without difficulty. Pt denies f/c, numbness/tingling in the extremities, weakness, saddle anesthesia, dysuria.

## 2019-07-13 NOTE — ED ADULT NURSE NOTE - NSIMPLEMENTINTERV_GEN_ALL_ED
Implemented All Fall with Harm Risk Interventions:  [place] to call system. Call bell, personal items and telephone within reach. Instruct patient to call for assistance. Room bathroom lighting operational. Non-slip footwear when patient is off stretcher. Physically safe environment: no spills, clutter or unnecessary equipment. Stretcher in lowest position, wheels locked, appropriate side rails in place. Provide visual cue, wrist band, yellow gown, etc. Monitor gait and stability. Monitor for mental status changes and reorient to person, place, and time. Review medications for side effects contributing to fall risk. Reinforce activity limits and safety measures with patient and family. Provide visual clues: red socks.

## 2019-07-13 NOTE — ED ADULT TRIAGE NOTE - CHIEF COMPLAINT QUOTE
Pt c/o being incontinent of urine that started last night. He denies any burning with urination. Pt is hypertensive in triage, he reports a known h/o HTN but does not want to take any medication.

## 2019-07-13 NOTE — ED ADULT NURSE REASSESSMENT NOTE - NS ED NURSE REASSESS COMMENT FT1
Handoff to RN Edglkeegan. Pt is awake and alert. Still refuses all VS, meds and blood work. No signs of distress noted. Denies any complains.

## 2019-07-13 NOTE — ED ADULT NURSE NOTE - CHPI ED NUR SYMPTOMS NEG
no fever/no chills/no dysuria/no blood in stool/no hematuria/no nausea/no diarrhea/no abdominal distension

## 2019-07-15 LAB
CULTURE RESULTS: SIGNIFICANT CHANGE UP
SPECIMEN SOURCE: SIGNIFICANT CHANGE UP

## 2021-03-12 NOTE — ED ADULT NURSE NOTE - NSFALLRSKPASTHIST_ED_ALL_ED
3/12/2021         RE: Deejay Dior  [address]  [facility] [place] [address]        Dear Colleague,    Thank you for referring your patient, Deejay Dior, to the [facility] [place]. Please see a copy of my visit note below.    Mr. Dior was seen via a telephone visit.  ID:  Mr. 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  On pred 5mg every day and Jakafi 5 mg bid.    He admitted on 2/8/2021 for acute hypoxic respiratory failure secondary to COVID-19 pneumonia. Also rhino virus positive. Transferred to MICU on 2/10 for worsening respiratory status requiring intubation 2/13-2/19. Discharged on 3/3 with home O2. Treated with remdesevir, dexamethasone and now on eliquis 2.5 bid (through 3/30). Still has fatigue. Getting PT/OT. Needs home O2 when exerting, not needing it at rest. Very dry eyes, using artificial tears 5-6 times/ days. Also has developed macular degeneration. S/P R hip replacement, now with minimal pain.  Eating well with no N/V/D.  Had a basal cell cancer lesion removed (Moh's resection) close to left eye. (In the past has had a basal and squamous cell removed)    His COVID PCR was positive on 2/28. Repeat has not been done. Also the person who came to draw his labs yesterday could not get them  Remainder of 10 pt ROS negative      ASSESSMENT AND PLAN:  Mr. 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 Total cell dose (from 7/1 & 7/2) 6.53 x 10^6 CD34+ cells/kg.   - 1 year anniversary (July 2015): 30% cellular, trilineage hematopoiesis, no abnormal blasts by morphology or flow , no dysplasia, 0-1 fibrosis, 100% donor (BM, CD3, CD15). CR  - 2 year anniversary (July 2016): 20-30% cellular, trilineage hematopoiesis, no abnormal  blasts by morphology or flow , no dysplasia, No fibrosis, 100% donor in BM (PB not sent). ISCN: //46,XX[20] Complete Remission.     HEME: No transfusion needs, counts stable     - Was on anticoagulation after his hip replacment, this is now complete.      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 Had been off prednisone since summer 2017 and briefly tapered off Sirolimus (january 2018), however resumed with flare in February. There was some concern that he had a flare of pulm GVH or sirolimus lung toxicity. Sirolimus was stopped on 9/27 & Pred 90mg was started. Seen by Dr. Sandoval on 10/2, please see his note. He does not think his symptoms or CT findings are c/w Sirolimus or GVH & he was in favor of tapering Prednisone. Recently he has worsening skin thickening & desquamation to the RLE, c/w GVH.   Started Jakafi 10/22 at 5 mg BID with symptom improvement in lower extremities. 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   Burst pred 40mg a day on 1/3 with significant systemic arthralgic pain, tapered back to 10/0 eod after 1 week, near resolution of symptoms noted 1/17, returned to 10 mg every other day; then dropped to 5mg q day in April     Currently on Jakafi 5mg twice daily & Pred 5mg every day.    ID:  Afebrile no signs/sx of infection.   - IgG 1/24 =1130  - Prophy: HD ACV (renal dosing), Fluconazole and  Bactrim.         GI:  protonix (has heartburn)     FEN/Renal:  Lytes & creat stable.     Fatigue:  stable  - History of testosterone deficiency, rechecked and modestly low. He previously did testosterone injections & didn't think it made him feel any better.    - TSH normal 2/28 and again on repeat 9/27 at 1.01.  - counseled that moderate exercise is really the only known way to combat fatigue, and acknowledged how difficult it is to balance too much with not enough  activity.      Derm:   Venous stasis: see below-most recently had sclerotherapy to left LE     Vasc:   10/15 Right leg US with small (technically DVT) clot in posterior tibial vein: started reg dose aspirin daily 325mg and recheck US in 2 weeks or symptomatically. 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  H/o chronic venous statis: s/p sclerotherapy to the L leg.  kelfex day prior and 4 days following.      MS: avascular necrosis of hip.  S/p replacement surgery    11.  Wounds: wound on right lower shin and gluteal cleft and left buttock:  (?mild trauma-thin skin with chronic prednisone and less likely GVH), no signs of infection.  -WOC following: see regimen from 3/1. Has home care RN coverage and will have them care for his skin     Dispo: PT/OT now recommending  discharge to home with 24hr caregiver, PT/OT, RN wound care, and home O2. All the services have been arranged by discharge coordinator. Spoke with Deejay's wife today and she is comfortable taking him home.  Discharge meds all done 3/2 so could be discharged today before 11 am. Discussed discharge meds with Deejay today.  Plan is to  eliquis from discharge pharmacy and  zoloft and oxycodone from [facility] in [place] where apparently the copay is less.    COVID-19 pneumonia (+1/30; neg 2/21), Repeat Covid 2/28=pos- needs a repeat COVID and labs  -Decreasing CRP=25 today.  + Rhinovirus on 2/9/2021  Remdesivir course completed on 2/12. He did complete a course of zithromax for possible superimposed bacterial pneumonia 2/9-2/11    - s/p 10-day course of dexamethasone (2/8-2/17) completed  - Enoxaparin 50 mg BID given elevated D-dimer;  eliquis=2.5 mg po bid, started on 3/2/2021.  Will continue for 30 days and then stop.      Needs covid  testing arranged. Needs labs drawn next week  RTC in 1 month to see me   Total time spent on phone visit 16 minutes    Suzanne Collado    Deejay is a 72 year old who is being evaluated via a billable telephone visit.      What phone number would you like to be contacted at? [phone]  [facility]  Phone call duration: 16 minutes    Jesica Ayala CMA on 3/12/2021 at 8:43 AM        Again, thank you for allowing me to participate in the care of your patient.        Sincerely,        Suzanne Collado MD     no

## 2022-03-08 NOTE — ED PROVIDER NOTE - PHYSICAL EXAMINATION
GEN: NAD, A & O x 4  HEAD/EYES: NCAT, PERRL, EOMI, anicteric sclerae, no conjunctival pallor  ENT: mucus membranes moist, oropharynx WNL, trachea midline, no JVD  RESP: lungs CTA with equal breath sounds bilaterally, chest wall nontender and atraumatic  CV: heart with reg rhythm S1, S2, no murmur; distal pulses intact and symmetric bilaterally  GI: normoactive bowel sounds. the abdomen is soft, nondistended, nontender, there are no palpable masses  : no CVAT  MSK: extremities atraumatic and nontender, no edema, no neck or back tenderness  SKIN: warm, dry, no rash, no bruising, no cyanosis. color appropriate for ethnicity  NEURO: alert, mentating appropriately, sensation is normal and strength is normal, no pronator drift, cranial nerves II-XII intact No

## 2024-03-08 NOTE — ED CDU PROVIDER INITIAL DAY NOTE - PROGRESS NOTE DETAILS
PA NOTE: Pt refuses to take medications for elevated blood pressure, states does not take meds at home and "needs magnesium to treat his BP". 190

## 2025-03-13 NOTE — ED CDU PROVIDER INITIAL DAY NOTE - CROS ED ENMT ALL NEG
negative...
Madiha Peres.  [facility]  [address] [place] [address]  Phone: [phone]  Fax: [phone]  Established Patient  Follow Up Time: 1-3 days